# Patient Record
Sex: MALE | Race: WHITE | NOT HISPANIC OR LATINO | Employment: FULL TIME | URBAN - NONMETROPOLITAN AREA
[De-identification: names, ages, dates, MRNs, and addresses within clinical notes are randomized per-mention and may not be internally consistent; named-entity substitution may affect disease eponyms.]

---

## 2017-01-20 ENCOUNTER — OFFICE VISIT (OUTPATIENT)
Dept: INTERNAL MEDICINE | Facility: CLINIC | Age: 42
End: 2017-01-20

## 2017-01-20 VITALS
SYSTOLIC BLOOD PRESSURE: 130 MMHG | HEIGHT: 69 IN | HEART RATE: 94 BPM | BODY MASS INDEX: 35.7 KG/M2 | WEIGHT: 241 LBS | OXYGEN SATURATION: 98 % | DIASTOLIC BLOOD PRESSURE: 90 MMHG

## 2017-01-20 DIAGNOSIS — R74.01 ELEVATED ALANINE AMINOTRANSFERASE (ALT) LEVEL: Primary | ICD-10-CM

## 2017-01-20 DIAGNOSIS — R74.01 ELEVATED AST (SGOT): ICD-10-CM

## 2017-01-20 DIAGNOSIS — Z13.21 ENCOUNTER FOR VITAMIN DEFICIENCY SCREENING: ICD-10-CM

## 2017-01-20 DIAGNOSIS — Z79.899 ENCOUNTER FOR LONG-TERM CURRENT USE OF MEDICATION: ICD-10-CM

## 2017-01-20 DIAGNOSIS — R07.9 CHEST PAIN, UNSPECIFIED TYPE: ICD-10-CM

## 2017-01-20 DIAGNOSIS — Z12.5 ENCOUNTER FOR SPECIAL SCREENING EXAMINATION FOR NEOPLASM OF PROSTATE: ICD-10-CM

## 2017-01-20 DIAGNOSIS — R53.83 FATIGUE, UNSPECIFIED TYPE: ICD-10-CM

## 2017-01-20 DIAGNOSIS — K22.2 LOWER ESOPHAGEAL RING: ICD-10-CM

## 2017-01-20 PROCEDURE — 93000 ELECTROCARDIOGRAM COMPLETE: CPT | Performed by: FAMILY MEDICINE

## 2017-01-20 PROCEDURE — 99214 OFFICE O/P EST MOD 30 MIN: CPT | Performed by: FAMILY MEDICINE

## 2017-01-20 NOTE — MR AVS SNAPSHOT
Edmunod Danielle   2017 3:15 PM   Office Visit    Provider:  Cem Sarmiento DO   Department:  [facility] PRIMARY CARE   Dept Phone:  [phone]                Your Full Care Plan              Your Updated Medication List      Notice  As of 2017  4:34 PM    You have not been prescribed any medications.            We Performed the Following     ECG 12 Lead       You Were Diagnosed With        Codes Comments    Elevated alanine aminotransferase (ALT) level    -  Primary ICD-10-CM: R74.0  ICD-9-CM: 790.4     Elevated AST (SGOT)     ICD-10-CM: R74.0  ICD-9-CM: 790.4     Fatigue, unspecified type     ICD-10-CM: R53.83  ICD-9-CM: 780.79     Encounter for long-term current use of medication     ICD-10-CM: Z79.899  ICD-9-CM: V58.69     Encounter for special screening examination for neoplasm of prostate     ICD-10-CM: Z12.5  ICD-9-CM: V76.44     Encounter for vitamin deficiency screening     ICD-10-CM: Z13.21  ICD-9-CM: V77.99     Lower esophageal ring     ICD-10-CM: Q39.3  ICD-9-CM: 750.3     Chest pain, unspecified type     ICD-10-CM: R07.9  ICD-9-CM: 786.50       Instructions     None    Patient Instructions History      [employer] Signup     [facility] allows you to send messages to your doctor, view your test results, renew your prescriptions, schedule appointments, and more. To sign up, go to [employer] and click on the Sign Up Now link in the New User? box. Enter your [employer] Activation Code exactly as it appears below along with the last four digits of your Social Security Number and your Date of Birth () to complete the sign-up process. If you do not sign up before the expiration date, you must request a new code.    [employer] Activation Code: [email or web address]  Expires: 2/3/2017  4:34 PM    If you have questions, you can email [email or web address] or call [phone] to talk to our [employer] staff. Remember, [employer] is NOT to be used  "for urgent needs. For medical emergencies, dial 911.               Other Info from Your Visit           Allergies     No Known Allergies      Reason for Visit     Annual Exam           Vital Signs     Blood Pressure Pulse Height Weight Oxygen Saturation Body Mass Index    130/90 94 69\" (175.3 cm) 241 lb (109 kg) 98% 35.59 kg/m2    Smoking Status                   Never Smoker           Problems and Diagnoses Noted     Elevated alanine aminotransferase (ALT) level    Elevated AST (SGOT)    Encounter for long-term (current) use of medications    Encounter for special screening examination for neoplasm of prostate    Encounter for vitamin deficiency screening    Tiredness    Lower esophageal ring    Chest pain          Results     ECG 12 Lead             "

## 2017-01-20 NOTE — PROGRESS NOTES
Subjective   Edmundo Santos is a 41 y.o. male.     History of Present Illness   Edmundo has been doing well. He needs to get yearly labs and checkup on how things are doing.  We need to start adding PSA to labs.  VSS. NAD.shayy  He's not fasting today.  He never had ERCP or EGD done.  He has found that he only has trouble when he's eathing thich foods.  He's been too busy with having family stuff going on to have it at that time, and he's just kind of put it off. He has managed well so far.  He chews his food well and drinks plenty of fluid.  He had chest pains over hina and went to the walk in clinic in [place].  He had no EKG, no labs, and was told it wasn't chest pain. He thinks it might have been indigestion.      The following portions of the patient's history were reviewed and updated as appropriate: allergies, current medications, past social history and problem list.    Review of Systems   Constitutional: Negative for appetite change, chills, fatigue, fever and unexpected weight change.   HENT: Negative for congestion, ear pain, hearing loss, nosebleeds, postnasal drip, rhinorrhea, sore throat, tinnitus and trouble swallowing.    Eyes: Negative for photophobia, discharge and visual disturbance.   Respiratory: Negative for cough, chest tightness, shortness of breath and wheezing.    Cardiovascular: Negative for chest pain, palpitations and leg swelling.   Gastrointestinal: Negative for abdominal distention, abdominal pain, blood in stool, constipation, diarrhea, nausea and vomiting.   Endocrine: Negative for cold intolerance, heat intolerance, polydipsia, polyphagia and polyuria.   Musculoskeletal: Negative for arthralgias, back pain, joint swelling, myalgias, neck pain and neck stiffness.   Skin: Negative for color change, pallor, rash and wound.   Allergic/Immunologic: Negative for environmental allergies, food allergies and immunocompromised state.   Neurological: Negative for dizziness, tremors, seizures,  "weakness, numbness and headaches.   Hematological: Negative for adenopathy. Does not bruise/bleed easily.   Psychiatric/Behavioral: Negative for agitation, behavioral problems, confusion, hallucinations, self-injury and suicidal ideas. The patient is not nervous/anxious.        Objective   Visit Vitals   • /90   • Pulse 94   • Ht 69\" (175.3 cm)   • Wt 241 lb (109 kg)   • SpO2 98%   • BMI 35.59 kg/m2     Physical Exam   Constitutional: He is oriented to person, place, and time. He appears well-developed and well-nourished. No distress.   HENT:   Head: Normocephalic and atraumatic.   Right Ear: External ear normal.   Left Ear: External ear normal.   Nose: Nose normal.   Mouth/Throat: Oropharynx is clear and moist.   Eyes: Conjunctivae and EOM are normal. Pupils are equal, round, and reactive to light. Right eye exhibits no discharge. Left eye exhibits no discharge. No scleral icterus.   Neck: Normal range of motion. Neck supple. No JVD present. No tracheal deviation present. No thyromegaly present.   Cardiovascular: Normal rate, regular rhythm, normal heart sounds and intact distal pulses.  Exam reveals no gallop and no friction rub.    No murmur heard.  Pulmonary/Chest: Effort normal and breath sounds normal. No stridor. No respiratory distress. He has no wheezes. He has no rales. He exhibits no tenderness.   Abdominal: Soft. Bowel sounds are normal. He exhibits no distension and no mass. There is no tenderness. There is no rebound and no guarding. No hernia.   Musculoskeletal: Normal range of motion. He exhibits no edema, tenderness or deformity.   Lymphadenopathy:     He has no cervical adenopathy.   Neurological: He is alert and oriented to person, place, and time. He has normal reflexes. He displays normal reflexes. No cranial nerve deficit. He exhibits normal muscle tone.   Skin: Skin is warm and dry. No rash noted. No erythema. No pallor.   Psychiatric: He has a normal mood and affect. His behavior is " normal. Judgment normal.       ECG 12 Lead  Date/Time: 1/20/2017 4:27 PM  Performed by: CAESAR PINZON  Authorized by: CAESAR PINZON   Interpreted by ED physician: Caesar Pinzon DO.  Rhythm: sinus rhythm  Rate: normal  Conduction: conduction normal  ST Segments: ST segments normal  T Waves: T waves normal  QRS axis: normal  Other: no other findings  Clinical impression: normal ECG            Assessment/Plan   Problem List Items Addressed This Visit        Digestive    Lower esophageal ring       Other    Elevated AST (SGOT)    Relevant Orders    Lipid Panel    Comprehensive Metabolic Panel    CBC & Differential    T4, Free    TSH    Vitamin D 25 Hydroxy    PSA    Urinalysis With / Microscopic If Indicated    Elevated alanine aminotransferase (ALT) level - Primary    Relevant Orders    Lipid Panel    Comprehensive Metabolic Panel    CBC & Differential    T4, Free    TSH    Vitamin D 25 Hydroxy    PSA    Urinalysis With / Microscopic If Indicated    Fatigue    Relevant Orders    Lipid Panel    Comprehensive Metabolic Panel    CBC & Differential    T4, Free    TSH    Vitamin D 25 Hydroxy    PSA    Urinalysis With / Microscopic If Indicated    Encounter for long-term current use of medication    Relevant Orders    Lipid Panel    Comprehensive Metabolic Panel    CBC & Differential    T4, Free    TSH    Vitamin D 25 Hydroxy    PSA    Urinalysis With / Microscopic If Indicated    Encounter for special screening examination for neoplasm of prostate    Relevant Orders    Lipid Panel    Comprehensive Metabolic Panel    CBC & Differential    T4, Free    TSH    Vitamin D 25 Hydroxy    PSA    Urinalysis With / Microscopic If Indicated    Encounter for vitamin deficiency screening    Relevant Orders    Lipid Panel    Comprehensive Metabolic Panel    CBC & Differential    T4, Free    TSH    Vitamin D 25 Hydroxy    PSA    Urinalysis With / Microscopic If Indicated      Other Visit Diagnoses     Chest pain, unspecified type                he had the sleep study, tried the machine, didn't like it, and is now sleeping in another room from his wife and they are doing ok with that for now.    FU in a month to go over labs.

## 2017-07-12 ENCOUNTER — OFFICE VISIT (OUTPATIENT)
Dept: INTERNAL MEDICINE | Facility: CLINIC | Age: 42
End: 2017-07-12

## 2017-07-12 VITALS
OXYGEN SATURATION: 91 % | DIASTOLIC BLOOD PRESSURE: 80 MMHG | WEIGHT: 241 LBS | SYSTOLIC BLOOD PRESSURE: 140 MMHG | BODY MASS INDEX: 35.7 KG/M2 | HEIGHT: 69 IN | HEART RATE: 88 BPM | TEMPERATURE: 98.2 F

## 2017-07-12 DIAGNOSIS — J18.9 CAP (COMMUNITY ACQUIRED PNEUMONIA): Primary | ICD-10-CM

## 2017-07-12 PROCEDURE — 99213 OFFICE O/P EST LOW 20 MIN: CPT | Performed by: FAMILY MEDICINE

## 2017-07-12 RX ORDER — ALBUTEROL SULFATE 90 UG/1
AEROSOL, METERED RESPIRATORY (INHALATION)
COMMUNITY
Start: 2017-07-10

## 2017-07-12 RX ORDER — LEVOFLOXACIN 750 MG/1
750 TABLET ORAL DAILY
Qty: 14 TABLET | Refills: 0 | Status: SHIPPED | OUTPATIENT
Start: 2017-07-12

## 2017-07-12 RX ORDER — BENZONATATE 200 MG/1
CAPSULE ORAL
COMMUNITY
Start: 2017-07-10

## 2017-07-12 RX ORDER — PROMETHAZINE HYDROCHLORIDE AND CODEINE PHOSPHATE 6.25; 1 MG/5ML; MG/5ML
5 SYRUP ORAL EVERY 4 HOURS PRN
Qty: 120 ML | Refills: 0 | Status: SHIPPED | OUTPATIENT
Start: 2017-07-12

## 2017-07-12 NOTE — PROGRESS NOTES
"Subjective   Edmundo Santos is a 42 y.o. male.     History of Present Illness   Edmundo has been to [facility] and was treated with ventolin and benzonatate for bronchitits.  He occasionally coughs up yellow phlegm.  He is not a smoker.  The cough is worse at night.  He hada  Temp of 102 lat night as well.        The following portions of the patient's history were reviewed and updated as appropriate: allergies, current medications, past social history and problem list.    Review of Systems   Constitutional: Positive for fever.   Respiratory: Positive for cough.        Objective   /80  Pulse 88  Temp 98.2 °F (36.8 °C)  Ht 69\" (175.3 cm)  Wt 241 lb (109 kg)  SpO2 91%  BMI 35.59 kg/m2  Physical Exam   Constitutional: He is oriented to person, place, and time. He appears well-developed and well-nourished. No distress.   HENT:   Head: Normocephalic and atraumatic.   Right Ear: External ear normal.   Left Ear: External ear normal.   Nose: Nose normal.   Mouth/Throat: Oropharynx is clear and moist.   Eyes: Conjunctivae and EOM are normal. Pupils are equal, round, and reactive to light. Right eye exhibits no discharge. Left eye exhibits no discharge. No scleral icterus.   Neck: Normal range of motion. Neck supple. No JVD present. No tracheal deviation present. No thyromegaly present.   Cardiovascular: Normal rate, regular rhythm, normal heart sounds and intact distal pulses.  Exam reveals no gallop and no friction rub.    No murmur heard.  Pulmonary/Chest: Effort normal. No stridor. No respiratory distress. He has no wheezes. He has no rales. He exhibits no tenderness.   LLL crackles and rhonchii   Abdominal: Soft. Bowel sounds are normal. He exhibits no distension and no mass. There is no tenderness. There is no rebound and no guarding. No hernia.   Musculoskeletal: Normal range of motion. He exhibits no edema, tenderness or deformity.   Lymphadenopathy:     He has no cervical adenopathy.   Neurological: He is " alert and oriented to person, place, and time. He has normal reflexes. He displays normal reflexes. No cranial nerve deficit. He exhibits normal muscle tone.   Skin: Skin is warm and dry. No rash noted. No erythema. No pallor.   Psychiatric: He has a normal mood and affect. His behavior is normal. Judgment normal.       Assessment/Plan   Problem List Items Addressed This Visit        Respiratory    CAP (community acquired pneumonia) - Primary    Relevant Medications    benzonatate (TESSALON) 200 MG capsule    VENTOLIN  (90 BASE) MCG/ACT inhaler    levoFLOXacin (LEVAQUIN) 750 MG tablet    promethazine-codeine (PHENERGAN with CODEINE) 6.25-10 MG/5ML syrup